# Patient Record
Sex: FEMALE
[De-identification: names, ages, dates, MRNs, and addresses within clinical notes are randomized per-mention and may not be internally consistent; named-entity substitution may affect disease eponyms.]

---

## 2019-03-01 ENCOUNTER — HOSPITAL ENCOUNTER (OUTPATIENT)
Dept: HOSPITAL 80 - FSGY | Age: 19
Discharge: HOME | End: 2019-03-01
Attending: ORTHOPAEDIC SURGERY
Payer: MEDICAID

## 2019-03-01 VITALS — SYSTOLIC BLOOD PRESSURE: 92 MMHG | DIASTOLIC BLOOD PRESSURE: 55 MMHG

## 2019-03-01 DIAGNOSIS — M76.821: ICD-10-CM

## 2019-03-01 DIAGNOSIS — Q74.2: Primary | ICD-10-CM

## 2019-03-01 PROCEDURE — 0QBG0ZZ EXCISION OF RIGHT TIBIA, OPEN APPROACH: ICD-10-PCS | Performed by: ORTHOPAEDIC SURGERY

## 2019-03-01 PROCEDURE — 0LSN0ZZ REPOSITION RIGHT LOWER LEG TENDON, OPEN APPROACH: ICD-10-PCS | Performed by: ORTHOPAEDIC SURGERY

## 2019-03-01 PROCEDURE — C1713 ANCHOR/SCREW BN/BN,TIS/BN: HCPCS

## 2019-03-01 NOTE — POSTOPPROG
Post Op Note


Date of Operation: 03/01/19


Surgeon: Jalen Paula


Assistant: none


Anesthesiologist: Elvia


Anesthesia: GET(General Endotracheal)


Pre-op Diagnosis: R accessory navicular


Post-op Diagnosis: same


Indication: above


Procedure: R kidner


Inf/Abcess present in the surg proc area at time of surgery?: No


EBL: Minimal

## 2019-03-01 NOTE — PDANEPAE
ANE History of Present Illness


R navicular fx here for kidner procedure











ANE Past Medical History





- Cardiovascular History


Hx Hypertension: No


Hx Arrhythmias: No


Hx Chest Pain: No


Hx Coronary Artery / Peripheral Vascular Disease: No


Hx CHF / Valvular Disease: No


Hx Palpitations: No





- Pulmonary History


Hx COPD: No


Hx Asthma/Reactive Airway Disease: No


Hx Recent Upper Respiratory Infection: No


Hx Oxygen in Use at Home: No


Hx Sleep Apnea: No


Sleep Apnea Screening Result - Last Documented: Negative





- Neurologic History


Hx Cerebrovascular Accident: No


Hx Seizures: No


Hx Dementia: No





- Endocrine History


Hx Diabetes: No





- Renal History


Hx Renal Disorders: No





- Liver History


Hx Hepatic Disorders: No





- Neurological & Psychiatric Hx


Hx Neurological and Psychiatric Disorders: No





- Cancer History


Hx Cancer: No





- Congenital Disorder History


Hx Congenital Disorders: No





- GI History


Hx Gastrointestinal Disorders: No





- Other Health History


Other Health History: none





- Chronic Pain History


Chronic Pain: No





- Surgical History


Prior Surgeries: none in last 5 yrs.  tonsilectomy 4yrs old





ANE Review of Systems


Review of Systems: 








- Exercise capacity


METS (RN): 5 METS





ANE Patient History





- Allergies


Allergies/Adverse Reactions: 








No Known Allergies Allergy (Verified 02/25/19 14:30)


 








- Home Medications


Home Medications: 








Calcium  02/25/19 [Last Taken 3 Days Ago ~02/26/19]


Tums 500MG (*)  02/25/19 [Last Taken 3 Days Ago ~02/26/19]








- NPO status


NPO Status: no food or drink >8 hours


NPO Since - Liquids (Date): 02/28/19


NPO Since - Liquids (Time): 23:00


NPO Since - Solids (Date): 02/28/19


NPO Since - Solids (Time): 23:00





- Anes Hx


Anes Hx: no prior problems





- Smoking Hx


Smoking Status: Never smoked





- Alcohol Use


Alcohol Use: None





- Family Anes Hx


Family Anes Hx: none


Family Hx Anesthesia Complications: none





ANE Labs/Vital Signs





- Vital Signs


Blood Pressure: 111/78


Heart Rate: 68


Respiratory Rate: 11


O2 Sat (%): 97


Height: 162.56 cm


Weight: 65.771 kg





ANE Physical Exam





- Airway


Neck exam: FROM


Mallampati Score: Class 2


Mouth exam: normal dental/mouth exam





- Pulmonary


Pulmonary: no respiratory distress, clear to auscultation





- Cardiovascular


Cardiovascular: regular rate and rhythym, no murmur, rub, or gallop





- ASA Status


ASA Status: I





ANE Anesthesia Plan


Anesthesia Plan: GA w LMA


Regional Anesthesia: single shot NB, popliteal SNB

## 2024-08-04 NOTE — GOP
Use warm compresses on your affected skin area to help promote any drainage.  Keep your skin clean and dry.  Avoid the use of any irritating skin products.    If you develop any worsening or concerning symptoms, please seek immediate medical evaluation   [f rep st]



                                                                OPERATIVE REPORT





DATE OF OPERATION:  03/01/2019



SURGEON:  Jalen Paula MD



ASSISTANT:  None.



ANESTHESIA:  General popliteal and saphenous block performed for postop pain control.



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



PROCEDURE PERFORMED:  Right Kidner procedure with excision of accessory navicular and reattachment of
 tendon.



FINDINGS:  



SPECIMENS:  None.



ESTIMATED BLOOD LOSS:  5 mL.



INDICATIONS:  A female with significant pain in this area.  An MRI confirmed inflammation in the acce
ssory navicular.  Her pain isolated to this area and I felt she would do well with a Kidner procedure
.  We discussed risks of need for more surgery, flatfoot, nonhealing of the tendon, need for transfer
, rupture, continued pain, nerve injury, blood clot, and she would like to proceed.  Informed was obt
ained.  All questions were answered.  She was marked preoperatively.



DESCRIPTION OF PROCEDURE:  She was taken to the operative suite, sterilely prepped, draped in a allyssa
l fashion.  Time-out was performed verifying the site, side, location.  All were in agreement with [facility] team. 



I made an incision over the accessory navicular and the posterior tibial tendinitis.  I dissected luis enrique
n this opening the sheath, protecting neurovascular structures, isolated the tendon.  There was a por
tion of the undersurface of the tendon which was degenerative which was debrided, but for the most pa
rt, this looked good.  I elevated this from dorsal to plantar off the accessory navicular and the shala
icular proper leaving the distal attachments intact.  I then removed the accessory navicular, as well
 as a portion of the prominent navicular and smoothed this back.  I checked an x-ray to make sure thi
s was all removed.  I placed 2 suture tack anchors in the navicular from [employer], and then, these wer
e brought through the tendon in a mattress double over fashion and the tendon was brought back to the
 bone, achieving a stable repair, then over-sewed this with 0 Vicryl.  She was closed with 0 Vicryl, 
2-0 Vicryl, 3-0 Quill, and Dermabond.  She was taken to PACU in a splint, in stable condition.



COMPLICATIONS:  None.



DRAINS:  None.



CONDITION:  Stable.





Job #:  [id number]